# Patient Record
(demographics unavailable — no encounter records)

---

## 2024-10-21 NOTE — DISCUSSION/SUMMARY
[FreeTextEntry1] : 33-year-old right-handed history of chronic migraines, migraine without aura.  Very good response to Emgality and 20 mg subcu monthly.  Having end of the month right before the next injection breakthrough headaches. Nurtec 75 mg apparently does not help. Reviewed and discussed treatment. Will continue Emgality 100 mg subcu monthly. Plan will add Ubrelvy 100 mg tablet, 1 as needed for headache, can repeat within 2 hours, maximum dose 20 mg/day. Also to try, eletriptan 40 mg p.o. as needed headache, can if necessary repeat within 2 hours.  Maximum dose of 80 mg/day. Advised to maintain headache diary. If no improvement will let me know before next visit. Return to office, 6 months.

## 2024-10-21 NOTE — REASON FOR VISIT
[Follow-Up: _____] : a [unfilled] follow-up visit [Home] : at home, [unfilled] , at the time of the visit. [Medical Office: ([facility])___] : at the medical office located in  [Patient] : the patient

## 2025-02-24 NOTE — PLAN
[FreeTextEntry1] : Likely viral syndrome - f/u CXR - f/u viral swab - trial of medrol zheng and flonase - further recs pending above

## 2025-02-24 NOTE — PHYSICAL EXAM
[No Acute Distress] : no acute distress [Well-Appearing] : well-appearing [Normal Sclera/Conjunctiva] : normal sclera/conjunctiva [PERRL] : pupils equal round and reactive to light [Normal Outer Ear/Nose] : the outer ears and nose were normal in appearance [Normal Oropharynx] : the oropharynx was normal [Normal TMs] : both tympanic membranes were normal [No Lymphadenopathy] : no lymphadenopathy [Supple] : supple [No Respiratory Distress] : no respiratory distress  [No Accessory Muscle Use] : no accessory muscle use [Clear to Auscultation] : lungs were clear to auscultation bilaterally [Normal Rate] : normal rate  [Regular Rhythm] : with a regular rhythm [Normal Affect] : the affect was normal [Normal Insight/Judgement] : insight and judgment were intact

## 2025-02-24 NOTE — HEALTH RISK ASSESSMENT
[1] : 2) Feeling down, depressed, or hopeless for several days (1) [PHQ-2 Negative - No further assessment needed] : PHQ-2 Negative - No further assessment needed [Current] : Current [10-14] : 10-14 [[id number]] : 2

## 2025-02-24 NOTE — HISTORY OF PRESENT ILLNESS
[FreeTextEntry8] : 33 year old female presents for congestion and not feeling well. Started 3 weeks ago when her daughter was sick. That resolved for a few days and then a week ago she got sick again with chest congestion, chest tightness, SOB and upper back pain with deep breaths/coughing. She states sputum is speckled black when it comes up. She has had intermittent fever, chills and body aches as well.